# Patient Record
Sex: FEMALE | Race: WHITE | Employment: STUDENT | URBAN - METROPOLITAN AREA
[De-identification: names, ages, dates, MRNs, and addresses within clinical notes are randomized per-mention and may not be internally consistent; named-entity substitution may affect disease eponyms.]

---
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
## 2021-08-06 ENCOUNTER — TELEPHONE (OUTPATIENT)
Dept: SCHEDULING | Age: 21
End: 2021-08-06

## 2021-08-08 ENCOUNTER — APPOINTMENT (OUTPATIENT)
Dept: URGENT CARE | Age: 21
End: 2021-08-08

## 2021-08-08 ENCOUNTER — WALK IN (OUTPATIENT)
Dept: URGENT CARE | Age: 21
End: 2021-08-08

## 2021-08-08 VITALS — TEMPERATURE: 98.3 F

## 2021-08-08 DIAGNOSIS — Z11.1 SCREENING-PULMONARY TB: Primary | ICD-10-CM

## 2021-08-08 PROCEDURE — 86580 TB INTRADERMAL TEST: CPT | Performed by: NURSE PRACTITIONER

## 2021-08-10 ENCOUNTER — WALK IN (OUTPATIENT)
Dept: URGENT CARE | Age: 21
End: 2021-08-10

## 2021-08-10 DIAGNOSIS — Z11.1 ENCOUNTER FOR PPD SKIN TEST READING: Primary | ICD-10-CM

## 2021-08-10 LAB
INDURATION: 0 MM (ref 0–?)
SKIN TEST RESULT: NEGATIVE

## 2021-08-10 PROCEDURE — X1094 NO CHARGE VISIT: HCPCS | Performed by: NURSE PRACTITIONER

## 2022-04-21 PROBLEM — F33.1 MODERATE EPISODE OF RECURRENT MAJOR DEPRESSIVE DISORDER (HCC): Status: ACTIVE | Noted: 2022-04-21

## 2022-04-21 PROBLEM — F41.1 GAD (GENERALIZED ANXIETY DISORDER): Status: ACTIVE | Noted: 2022-04-21

## 2022-04-25 ENCOUNTER — TELEPHONE (OUTPATIENT)
Dept: FAMILY MEDICINE CLINIC | Facility: CLINIC | Age: 22
End: 2022-04-25

## 2022-04-25 NOTE — TELEPHONE ENCOUNTER
No current hipaa consent in record since pt turned 19. Call to mayda/mom's cell reaches identified voice mail. Per hipaa consent, left vmm advising returning her call, our ofc is now closed for the day, req call back to triage nurse tomorrow to discuss her message. Provided ofc phone hours/contact number. Advised if she has urgent questions that can not wait until tomorrow, call our ofc number now and follow the voice mail prompts to reach our office on call provider.

## 2022-04-27 NOTE — TELEPHONE ENCOUNTER
Call to mayda/mom-no hipaa consent since pt turned 19-sts \"i'm at the vet, can't talk, will call back\" and disconnects call

## 2022-04-29 NOTE — TELEPHONE ENCOUNTER
Call to mayda/mom-pt has no current hipaa consent since turning 19-sts her brother was staying with them recently prior to his having knee surgery, reports his preop testing was positive for MRSA, he was asx. Sts he works in a nursing home. Ortho proceeded w knee surgery as planned. sts she and daughters are asx but concerned about their poss exposure to him and want input from gala RAY or dr Brenna Jolly. Advised will discuss w gala RAY as dr Brenna Jolly out of ofc today. Will call back to pt w further info. Recommended pt update hipaa consent since she is now > 18  Mom voices understanding, agrees w plan. Discussed above info w gala RAY. She confirms as long as pt has no sx, no action needed. If pt develops any blisters, open wounds or crusted areas should be evaluated. Also recommend pt schedule annual visit, since last one was > 2 yrs ago.

## 2024-06-25 ENCOUNTER — HOSPITAL ENCOUNTER (EMERGENCY)
Age: 24
Discharge: HOME | End: 2024-06-25
Attending: EMERGENCY MEDICINE

## 2024-06-25 VITALS
DIASTOLIC BLOOD PRESSURE: 66 MMHG | OXYGEN SATURATION: 98 % | WEIGHT: 135 LBS | TEMPERATURE: 99 F | SYSTOLIC BLOOD PRESSURE: 103 MMHG | BODY MASS INDEX: 21.19 KG/M2 | HEART RATE: 79 BPM | HEIGHT: 67 IN | RESPIRATION RATE: 16 BRPM

## 2024-06-25 DIAGNOSIS — N30.90 CYSTITIS: Primary | ICD-10-CM

## 2024-06-25 LAB
B-HCG UR QL: NEGATIVE
BILIRUB UR QL STRIP.AUTO: NEGATIVE
GLUCOSE UR STRIP.AUTO-MCNC: NEGATIVE MG/DL
KETONES UR STRIP.AUTO-MCNC: NEGATIVE MG/DL
NITRITE UR QL STRIP.AUTO: NEGATIVE
PH UR STRIP.AUTO: 5.5 [PH] (ref 5–8)
RBC #/AREA URNS AUTO: >10 /HPF
SP GR UR STRIP.AUTO: >=1.03 (ref 1–1.03)
UROBILINOGEN UR STRIP.AUTO-MCNC: 0.2 MG/DL
WBC #/AREA URNS AUTO: >50 /HPF

## 2024-06-25 PROCEDURE — 87186 SC STD MICRODIL/AGAR DIL: CPT | Performed by: EMERGENCY MEDICINE

## 2024-06-25 PROCEDURE — 99283 EMERGENCY DEPT VISIT LOW MDM: CPT

## 2024-06-25 PROCEDURE — 87077 CULTURE AEROBIC IDENTIFY: CPT | Performed by: EMERGENCY MEDICINE

## 2024-06-25 PROCEDURE — 87086 URINE CULTURE/COLONY COUNT: CPT | Performed by: EMERGENCY MEDICINE

## 2024-06-25 PROCEDURE — 81025 URINE PREGNANCY TEST: CPT

## 2024-06-25 PROCEDURE — 99284 EMERGENCY DEPT VISIT MOD MDM: CPT

## 2024-06-25 PROCEDURE — 81001 URINALYSIS AUTO W/SCOPE: CPT | Performed by: EMERGENCY MEDICINE

## 2024-06-25 PROCEDURE — 81015 MICROSCOPIC EXAM OF URINE: CPT | Performed by: EMERGENCY MEDICINE

## 2024-06-25 RX ORDER — CEPHALEXIN 500 MG/1
500 CAPSULE ORAL ONCE
Status: COMPLETED | OUTPATIENT
Start: 2024-06-25 | End: 2024-06-25

## 2024-06-25 RX ORDER — CEPHALEXIN 500 MG/1
500 CAPSULE ORAL 3 TIMES DAILY
Qty: 21 CAPSULE | Refills: 0 | Status: SHIPPED | OUTPATIENT
Start: 2024-06-25 | End: 2024-07-02

## 2024-06-26 NOTE — ED PROVIDER NOTES
Patient Seen in: [facility] In [place]      History     Chief Complaint   Patient presents with    Urinary Symptoms     Stated Complaint: frequency and burning urination    Subjective:   HPI    Pleasant 23-year-old female.  No significant medical history.  Began to develop dysuria and frequency yesterday morning.  Persist today.  No systemic fever or chills.  No back pain or flank pain.  No history of recurrent or resistant UTIs.    Objective:   Past Medical History:    Acute pharyngitis    Acute upper respiratory infections of unspecified site    Early onset of delivery, unspecified as to episode of care    Hemangioma of unspecified site    Pediculus capitis (head louse)    Personal history of pneumonia (recurrent)    Streptococcal sore throat    Unspecified otitis media              Past Surgical History:   Procedure Laterality Date    Appendectomy      Other surgical history      hemangioma on head    Other surgical history N/A 4/29/2016    Procedure: LAPAROSCOPIC APPENDECTOMY;  Surgeon: Kezia Katz MD;  Location:  MAIN OR    Other surgical history  2020    wisdom teeth    Tonsillectomy  2009/2010                Social History     Socioeconomic History    Marital status: Single   Tobacco Use    Smoking status: Never    Smokeless tobacco: Never   Vaping Use    Vaping status: Never Used   Substance and Sexual Activity    Alcohol use: Yes     Comment: occ    Drug use: Never    Sexual activity: Never     Partners: Male   Other Topics Concern    Caffeine Concern No    Exercise Yes     Comment: soccer              Review of Systems    Positive for stated Chief Complaint: Urinary Symptoms    Other systems are as noted in HPI.  Constitutional and vital signs reviewed.      All other systems reviewed and negative except as noted above.    Physical Exam     ED Triage Vitals [06/25/24 2030]   /66   Pulse 79   Resp 16   Temp 98.5 °F (36.9 °C)   Temp src Oral   SpO2 98 %   O2 Device None  (Room air)       Current Vitals:   Vital Signs  BP: 103/66  Pulse: 79  Resp: 16  Temp: 98.5 °F (36.9 °C)  Temp src: Oral    Oxygen Therapy  SpO2: 98 %  O2 Device: None (Room air)            Physical Exam      Gen: Well appearing, well groomed, alert and aware x 3  ENT: Atraumatic  Lung: No distress, RR, no retraction,  Back: Full range of motion, no CVA tenderness  Abdominal: Soft exam, no distention.  No pain to palpation all 4 quadrants    ED Course     Labs Reviewed   URINALYSIS WITH CULTURE REFLEX - Abnormal; Notable for the following components:       Result Value    Clarity Urine Cloudy (*)     Blood Urine Large (*)     Protein Urine 100 mg/dL (*)     Leukocyte Esterase Urine Moderate (*)     All other components within normal limits   POCT PREGNANCY URINE - Normal   UA MICROSCOPIC ONLY, URINE   URINE CULTURE, ROUTINE                      MDM      My supervising physician was involved in the management of this patient.    Well-appearing female with normal vital signs.  Urine demonstrates moderate leukocytes and large blood.  Will be sent for culture.  Urine pregnancy negative.  Patient has never had a UTI before.  She denies any systemic malaise, back pain or flank pain.  She received first dosage of Keflex in the ER.  Will continue 3 times daily for the next 7 days.  Please seek reevaluation for any worsening                               Medical Decision Making      Disposition and Plan     Clinical Impression:  1. Cystitis         Disposition:  There is no disposition on file for this visit.  There is no disposition time on file for this visit.    Follow-up:  Denisha West MD  [facility] [place] [address] [id number] [phone]    Follow up            Medications Prescribed:  Current Discharge Medication List        START taking these medications    Details   cephalexin 500 MG Oral Cap Take 1 capsule (500 mg total) by mouth 3 (three) times daily for 7 days.  Qty: 21 capsule, Refills: 0

## 2024-06-26 NOTE — DISCHARGE INSTRUCTIONS
Push clear fluids.  Take antibiotic as written.  If urine culture dictates a change in therapy you will be contacted.  For any worsening please seek reevaluation

## 2024-07-05 ENCOUNTER — OFFICE VISIT (OUTPATIENT)
Dept: FAMILY MEDICINE CLINIC | Facility: CLINIC | Age: 24
End: 2024-07-05
Payer: COMMERCIAL

## 2024-07-05 ENCOUNTER — LAB ENCOUNTER (OUTPATIENT)
Dept: LAB | Age: 24
End: 2024-07-05
Attending: STUDENT IN AN ORGANIZED HEALTH CARE EDUCATION/TRAINING PROGRAM
Payer: COMMERCIAL

## 2024-07-05 VITALS
HEART RATE: 72 BPM | BODY MASS INDEX: 20.25 KG/M2 | RESPIRATION RATE: 16 BRPM | SYSTOLIC BLOOD PRESSURE: 92 MMHG | DIASTOLIC BLOOD PRESSURE: 58 MMHG | HEIGHT: 67 IN | TEMPERATURE: 97 F | WEIGHT: 129 LBS

## 2024-07-05 DIAGNOSIS — Z13.228 SCREENING FOR METABOLIC DISORDER: ICD-10-CM

## 2024-07-05 DIAGNOSIS — Z00.00 WELLNESS EXAMINATION: ICD-10-CM

## 2024-07-05 DIAGNOSIS — Z11.1 SCREENING FOR TUBERCULOSIS: ICD-10-CM

## 2024-07-05 DIAGNOSIS — Z11.3 SCREENING FOR STD (SEXUALLY TRANSMITTED DISEASE): ICD-10-CM

## 2024-07-05 DIAGNOSIS — Z00.00 WELLNESS EXAMINATION: Primary | ICD-10-CM

## 2024-07-05 DIAGNOSIS — Z02.1 PRE-EMPLOYMENT EXAMINATION: ICD-10-CM

## 2024-07-05 DIAGNOSIS — Z01.84 IMMUNITY STATUS TESTING: ICD-10-CM

## 2024-07-05 DIAGNOSIS — Z02.89 ENCOUNTER FOR COMPLETION OF FORM WITH PATIENT: ICD-10-CM

## 2024-07-05 LAB
ALBUMIN SERPL-MCNC: 4.2 G/DL (ref 3.4–5)
ALBUMIN/GLOB SERPL: 1.2 {RATIO} (ref 1–2)
ALP LIVER SERPL-CCNC: 47 U/L
ALT SERPL-CCNC: 27 U/L
ANION GAP SERPL CALC-SCNC: 5 MMOL/L (ref 0–18)
AST SERPL-CCNC: 27 U/L (ref 15–37)
BASOPHILS # BLD AUTO: 0.01 X10(3) UL (ref 0–0.2)
BASOPHILS NFR BLD AUTO: 0.2 %
BILIRUB SERPL-MCNC: 0.6 MG/DL (ref 0.1–2)
BILIRUB UR QL STRIP.AUTO: NEGATIVE
BUN BLD-MCNC: 17 MG/DL (ref 9–23)
CALCIUM BLD-MCNC: 9.6 MG/DL (ref 8.5–10.1)
CHLORIDE SERPL-SCNC: 104 MMOL/L (ref 98–112)
CHOLEST SERPL-MCNC: 159 MG/DL (ref ?–200)
CLARITY UR REFRACT.AUTO: CLEAR
CO2 SERPL-SCNC: 29 MMOL/L (ref 21–32)
CREAT BLD-MCNC: 1.04 MG/DL
EGFRCR SERPLBLD CKD-EPI 2021: 78 ML/MIN/1.73M2 (ref 60–?)
EOSINOPHIL # BLD AUTO: 0.05 X10(3) UL (ref 0–0.7)
EOSINOPHIL NFR BLD AUTO: 1 %
ERYTHROCYTE [DISTWIDTH] IN BLOOD BY AUTOMATED COUNT: 14.3 %
FASTING PATIENT LIPID ANSWER: NO
FASTING STATUS PATIENT QL REPORTED: NO
GLOBULIN PLAS-MCNC: 3.5 G/DL (ref 2.8–4.4)
GLUCOSE BLD-MCNC: 96 MG/DL (ref 70–99)
GLUCOSE UR STRIP.AUTO-MCNC: NORMAL MG/DL
HBV CORE AB SERPL QL IA: NONREACTIVE
HBV SURFACE AB SER QL: REACTIVE
HBV SURFACE AB SERPL IA-ACNC: >1000 MIU/ML
HBV SURFACE AG SER-ACNC: <0.1 [IU]/L
HBV SURFACE AG SER-ACNC: <0.1 [IU]/L
HBV SURFACE AG SERPL QL IA: NONREACTIVE
HBV SURFACE AG SERPL QL IA: NONREACTIVE
HCT VFR BLD AUTO: 39.8 %
HCV AB SERPL QL IA: NONREACTIVE
HDLC SERPL-MCNC: 55 MG/DL (ref 40–59)
HGB BLD-MCNC: 13.8 G/DL
IMM GRANULOCYTES # BLD AUTO: 0.01 X10(3) UL (ref 0–1)
IMM GRANULOCYTES NFR BLD: 0.2 %
KETONES UR STRIP.AUTO-MCNC: NEGATIVE MG/DL
LDLC SERPL CALC-MCNC: 82 MG/DL (ref ?–100)
LEUKOCYTE ESTERASE UR QL STRIP.AUTO: NEGATIVE
LYMPHOCYTES # BLD AUTO: 1.65 X10(3) UL (ref 1–4)
LYMPHOCYTES NFR BLD AUTO: 34.1 %
MCH RBC QN AUTO: 31.6 PG (ref 26–34)
MCHC RBC AUTO-ENTMCNC: 34.7 G/DL (ref 31–37)
MCV RBC AUTO: 91.1 FL
MONOCYTES # BLD AUTO: 0.33 X10(3) UL (ref 0.1–1)
MONOCYTES NFR BLD AUTO: 6.8 %
NEUTROPHILS # BLD AUTO: 2.79 X10 (3) UL (ref 1.5–7.7)
NEUTROPHILS # BLD AUTO: 2.79 X10(3) UL (ref 1.5–7.7)
NEUTROPHILS NFR BLD AUTO: 57.7 %
NITRITE UR QL STRIP.AUTO: NEGATIVE
NONHDLC SERPL-MCNC: 104 MG/DL (ref ?–130)
OSMOLALITY SERPL CALC.SUM OF ELEC: 287 MOSM/KG (ref 275–295)
PH UR STRIP.AUTO: 7.5 [PH] (ref 5–8)
PLATELET # BLD AUTO: 236 10(3)UL (ref 150–450)
POTASSIUM SERPL-SCNC: 4.4 MMOL/L (ref 3.5–5.1)
PROT SERPL-MCNC: 7.7 G/DL (ref 6.4–8.2)
PROT UR STRIP.AUTO-MCNC: NEGATIVE MG/DL
RBC # BLD AUTO: 4.37 X10(6)UL
RBC UR QL AUTO: NEGATIVE
SODIUM SERPL-SCNC: 138 MMOL/L (ref 136–145)
SP GR UR STRIP.AUTO: 1.02 (ref 1–1.03)
TRIGL SERPL-MCNC: 124 MG/DL (ref 30–149)
TSI SER-ACNC: 1.62 MIU/ML (ref 0.36–3.74)
UROBILINOGEN UR STRIP.AUTO-MCNC: NORMAL MG/DL
VLDLC SERPL CALC-MCNC: 20 MG/DL (ref 0–30)
WBC # BLD AUTO: 4.8 X10(3) UL (ref 4–11)

## 2024-07-05 PROCEDURE — 82955 ASSAY OF G6PD ENZYME: CPT

## 2024-07-05 PROCEDURE — 81003 URINALYSIS AUTO W/O SCOPE: CPT

## 2024-07-05 PROCEDURE — 86480 TB TEST CELL IMMUN MEASURE: CPT

## 2024-07-05 PROCEDURE — 85025 COMPLETE CBC W/AUTO DIFF WBC: CPT

## 2024-07-05 PROCEDURE — 87340 HEPATITIS B SURFACE AG IA: CPT

## 2024-07-05 PROCEDURE — 87389 HIV-1 AG W/HIV-1&-2 AB AG IA: CPT

## 2024-07-05 PROCEDURE — 86704 HEP B CORE ANTIBODY TOTAL: CPT

## 2024-07-05 PROCEDURE — 80061 LIPID PANEL: CPT

## 2024-07-05 PROCEDURE — 86803 HEPATITIS C AB TEST: CPT

## 2024-07-05 PROCEDURE — 84443 ASSAY THYROID STIM HORMONE: CPT

## 2024-07-05 PROCEDURE — 86706 HEP B SURFACE ANTIBODY: CPT

## 2024-07-05 PROCEDURE — 86592 SYPHILIS TEST NON-TREP QUAL: CPT

## 2024-07-05 PROCEDURE — 80053 COMPREHEN METABOLIC PANEL: CPT

## 2024-07-05 NOTE — PROGRESS NOTES
[facility]  07/05/24    Chief Complaint   Patient presents with    Physical       HPI:   Alina Cantor is a 23 year old female who presents for a complete physical exam.     She always wanted to apply for [employer]. 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 Has some paperwork and labs due for the [employer].     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 (Menstrual status: Continuous Pill). No breakthrough bleeding or spotting  Immunizations: got original covid series and booster  Screenings: follows with gynecology      Wt Readings from Last 6 Encounters:   07/05/24 129 lb (58.5 kg)   06/25/24 135 lb (61.2 kg)   06/14/21 140 lb 9.6 oz (63.8 kg) (70%, Z= 0.53)*   12/11/20 145 lb 9.6 oz (66 kg) (77%, Z= 0.75)*   07/30/20 143 lb (64.9 kg) (76%, Z= 0.70)*   10/16/19 143 lb (64.9 kg) (78%, Z= 0.78)*     * Growth percentiles are based on CDC (Girls, 2-20 Years) data.     Body mass index is 20.2 kg/m².     Results for orders placed or performed during the hospital encounter of 06/25/24   Urinalysis with Culture Reflex    Specimen: Urine, clean catch   Result Value Ref Range    Urine Color Straw Yellow    Clarity Urine Cloudy (A) Clear    Spec Gravity >=1.030 1.005 - 1.030    Glucose Urine Negative Negative mg/dL    Bilirubin Urine Negative Negative    Ketones Urine Negative Negative mg/dL    Blood Urine Large (A) Negative    pH Urine 5.5 5.0 - 8.0    Protein Urine 100 mg/dL (A) Negative mg/dL    Urobilinogen Urine 0.2 <2.0 mg/dL    Nitrite Urine Negative Negative     Leukocyte Esterase Urine Moderate (A) Negative   UA Microscopic only, urine   Result Value Ref Range    WBC Urine >50 (A) 0 - 5 /HPF    RBC Urine >10 (A) 0 - 2 /HPF    Bacteria Urine Rare (A) None Seen /HPF    Squamous Epi. Cells Few (A) None Seen /HPF    Renal Tubular Epithelial Cells None Seen None Seen /HPF    Transitional Cells None Seen None Seen /HPF    Yeast Urine None Seen None Seen /HPF   POCT Pregnancy, Urine   Result Value Ref Range    POCT Urine Pregnancy Negative Negative   Urine Culture, Routine    Specimen: Urine, clean catch   Result Value Ref Range    Urine Culture >100,000 CFU/ML Klebsiella pneumoniae (A)        Susceptibility    Klebsiella pneumoniae -  (no method available)     Ampicillin  Resistant      Ampicillin + Sulbactam 4 Sensitive      Cefazolin <=4 Sensitive      Ciprofloxacin <=0.25 Sensitive      Gentamicin <=1 Sensitive      Meropenem <=0.25 Sensitive      Levofloxacin <=0.12 Sensitive      Nitrofurantoin 32 Sensitive      Piperacillin + Tazobactam <=4 Sensitive      Trimethoprim/Sulfa <=20 Sensitive        Current Outpatient Medications   Medication Sig Dispense Refill    FLUoxetine (PROZAC) 20 MG Oral Cap Take 1 capsule (20 mg total) by mouth every morning. 30 capsule 0    Norgestimate-Eth Estradiol 0.25-35 MG-MCG Oral Tab Take 1 tablet by mouth daily. 3 each 3      No Known Allergies   Past Medical History:    Acute pharyngitis    Acute upper respiratory infections of unspecified site    Early onset of delivery, unspecified as to episode of care    Hemangioma of unspecified site    Pediculus capitis (head louse)    Personal history of pneumonia (recurrent)    Streptococcal sore throat    Unspecified otitis media      Past Surgical History:   Procedure Laterality Date    Appendectomy      Other surgical history      hemangioma on head    Other surgical history N/A 4/29/2016    Procedure: LAPAROSCOPIC APPENDECTOMY;  Surgeon: Kezia Katz MD;  Location:  MAIN OR    Other surgical  history  2020    wisdom teeth    Tonsillectomy  2009/2010      Family History   Problem Relation Age of Onset    Heart Disease Maternal Grandmother     Other (Other) Maternal Grandmother     Heart Disease Paternal Grandfather     Stroke Paternal Grandfather     Other (Alzheimer's) Paternal Grandfather     Cancer Paternal Grandmother     Heart Disorder Maternal Grandfather     Heart Disorder Sister     Alcohol and Other Disorders Associated Father     No Known Problems Mother       Social History:   Social History     Socioeconomic History    Marital status: Single   Tobacco Use    Smoking status: Never    Smokeless tobacco: Never   Vaping Use    Vaping status: Never Used   Substance and Sexual Activity    Alcohol use: Yes     Comment: 1 every 2 weeks    Drug use: Never    Sexual activity: Never     Partners: Male   Other Topics Concern    Caffeine Concern Yes     Comment: 1 c. coffee some days    Exercise Yes     Comment: walk, pilates        REVIEW OF SYSTEMS:   GENERAL: feels well otherwise  SKIN: denies any unusual skin lesions  EYES:denies blurred vision or double vision  HEENT: denies nasal congestion, sinus pain or ST  LUNGS: denies shortness of breath with exertion, denies cough  CARDIOVASCULAR: denies chest pain on exertion or at rest, denies palpitations  GI: denies abdominal pain,denies heartburn, denies n/v/d/c/blood in stool  : denies dysuria, vaginal discharge or itching,periods regular   MUSCULOSKELETAL: denies back pain  NEURO: denies headaches, denies LH/dizziness/syncope  PSYCHE: see HPI  HEMATOLOGIC: denies hx of anemia  ENDOCRINE: denies thyroid history  ALL/ASTHMA: denies hx of allergy or asthma    EXAM:   BP 92/58   Pulse 72   Temp 97.2 °F (36.2 °C) (Temporal)   Resp 16   Ht 5' 7\" (1.702 m)   Wt 129 lb (58.5 kg)   BMI 20.20 kg/m²   Body mass index is 20.2 kg/m².   GENERAL: well developed, well nourished,in no apparent distress  SKIN: no rash  HEENT: atraumatic, normocephalic,ears and  throat are clear  EYES:PERRLA, EOMI,conjunctiva are clear  NECK: supple,no adenopathy,no thyromegaly  BREAST: deferred to gynecology  LUNGS: clear to auscultation; no rhonchi, rales, or wheezing  CARDIO: RRR without murmur  GI: good BS's, no masses, HSM or tenderness  : deferred to gynecology  MUSCULOSKELETAL: FROM of the back, negative SLR b/l  EXTREMITIES: no cyanosis, clubbing or edema  NEURO: Oriented times three,cranial nerves are intact,motor and sensory are grossly intact; 2+ knee reflexes bilaterally, gait normal  VASCULAR: 2+ posterior tibial pulses bilaterally and 2+ radial pulses b/l    ASSESSMENT AND PLAN:   Alina Cantor is a 23 year old female who presents for a complete physical exam.     - Pap and pelvic deferred to gynecology. Last pap: 10/06/2023 and was normal.  - BP: at goal  - Pt' s weight is Body mass index is 20.2 kg/m²., normal, recommended low fat diet and aerobic exercise 30 minutes three times weekly.      Labs ordered for [employer] paperwork. Signed physical form.       The patient indicates understanding of these issues and agrees to the plan.      Health maintenance, will check:   Orders Placed This Encounter   Procedures    CBC With Differential With Platelet    Comp Metabolic Panel (14)    TSH W Reflex To Free T4    Lipid Panel    Urinalysis with Culture Reflex    HIV AG AB Combo [E]    Hepatitis B Profile [E]    Hepatitis B Surface Antigen    HCV ANTIBODY [8472] [Q]    RPR W/REF TO TITER & CONFIRM [36148][Q]    G-6-Pd, Quant, Blood And Rbc    Quantiferon TB Plus           Encounter Diagnoses   Name Primary?    Wellness examination Yes    Pre-employment examination     Screening for STD (sexually transmitted disease)     Screening for tuberculosis     Screening for metabolic disorder     Immunity status testing     Encounter for completion of form with patient        Meds & Refills for this Visit:  Requested Prescriptions      No prescriptions requested or ordered in this  encounter       Imaging & Consults:  None      Return in about 1 year (around 7/5/2025) for annual physical, or sooner if needed.      Denisha West MD  [facility]  07/05/24

## 2024-07-07 LAB — RPR SER QL: NONREACTIVE

## 2024-07-08 LAB
M TB IFN-G CD4+ T-CELLS BLD-ACNC: 0.02 IU/ML
M TB TUBERC IFN-G BLD QL: NEGATIVE
M TB TUBERC IGNF/MITOGEN IGNF CONTROL: >10 IU/ML
QFT TB1 AG MINUS NIL: 0 IU/ML
QFT TB2 AG MINUS NIL: 0.01 IU/ML

## 2024-07-09 LAB
G6PD QN: 267 U/10E12 RBC
RBC: 4.34 X10E6/UL

## 2024-07-16 ENCOUNTER — HOSPITAL ENCOUNTER (EMERGENCY)
Age: 24
Discharge: HOME | End: 2024-07-16
Payer: COMMERCIAL

## 2024-07-16 VITALS
HEART RATE: 58 BPM | SYSTOLIC BLOOD PRESSURE: 96 MMHG | HEIGHT: 67 IN | DIASTOLIC BLOOD PRESSURE: 66 MMHG | TEMPERATURE: 99 F | OXYGEN SATURATION: 96 % | RESPIRATION RATE: 18 BRPM | WEIGHT: 130 LBS | BODY MASS INDEX: 20.4 KG/M2

## 2024-07-16 DIAGNOSIS — R30.0 DYSURIA: Primary | ICD-10-CM

## 2024-07-16 LAB
B-HCG UR QL: NEGATIVE
BILIRUB UR QL STRIP.AUTO: NEGATIVE
CLARITY UR REFRACT.AUTO: CLEAR
COLOR UR AUTO: YELLOW
GLUCOSE UR STRIP.AUTO-MCNC: NEGATIVE MG/DL
KETONES UR STRIP.AUTO-MCNC: NEGATIVE MG/DL
LEUKOCYTE ESTERASE UR QL STRIP.AUTO: NEGATIVE
NITRITE UR QL STRIP.AUTO: NEGATIVE
PH UR STRIP.AUTO: 6 [PH] (ref 5–8)
PROT UR STRIP.AUTO-MCNC: NEGATIVE MG/DL
RBC UR QL AUTO: NEGATIVE
SP GR UR STRIP.AUTO: 1.02 (ref 1–1.03)
UROBILINOGEN UR STRIP.AUTO-MCNC: 0.2 MG/DL

## 2024-07-16 PROCEDURE — 99283 EMERGENCY DEPT VISIT LOW MDM: CPT

## 2024-07-16 PROCEDURE — 81025 URINE PREGNANCY TEST: CPT

## 2024-07-16 PROCEDURE — 87086 URINE CULTURE/COLONY COUNT: CPT | Performed by: PHYSICIAN ASSISTANT

## 2024-07-16 PROCEDURE — 81003 URINALYSIS AUTO W/O SCOPE: CPT

## 2024-07-16 NOTE — DISCHARGE INSTRUCTIONS
Push clear fluids.  Monitor for any worsening.  If urine culture dictates further treatment you will be contacted

## 2024-07-16 NOTE — ED INITIAL ASSESSMENT (HPI)
Patient here with urinary frequency and lower abdominal pain that started today. States she was diagnosed with UTI a month ago and started with similar symptoms.

## 2024-07-16 NOTE — ED PROVIDER NOTES
Patient Seen in: [facility] In [place]      History     Chief Complaint   Patient presents with    Urinary Symptoms     Stated Complaint: urinary symptoms    Subjective:   HPI    23-year-old female.  Just this morning, patient began to develop subtle dysuria and frequency  Patient was treated for a UTI 1 month prior to arrival.  Subsequent culture demonstrated Klebsiella.  Resistant ampicillin.  She was treated successfully with Keflex.  Her symptoms resolved within a few days.  That episode, 1 month prior to arrival, was her first episode of cystitis in her lifetime.  She denies any back pain or flank pain.  No vaginal bleeding or discharge    Objective:   Past Medical History:    Acute pharyngitis    Acute upper respiratory infections of unspecified site    Early onset of delivery, unspecified as to episode of care    Hemangioma of unspecified site    Pediculus capitis (head louse)    Personal history of pneumonia (recurrent)    Streptococcal sore throat    Unspecified otitis media              Past Surgical History:   Procedure Laterality Date    Appendectomy      Other surgical history      hemangioma on head    Other surgical history N/A 4/29/2016    Procedure: LAPAROSCOPIC APPENDECTOMY;  Surgeon: Kezia Katz MD;  Location:  MAIN OR    Other surgical history  2020    wisdom teeth    Tonsillectomy  2009/2010                Social History     Socioeconomic History    Marital status: Single   Tobacco Use    Smoking status: Never    Smokeless tobacco: Never   Vaping Use    Vaping status: Never Used   Substance and Sexual Activity    Alcohol use: Yes     Comment: 1 every 2 weeks    Drug use: Never    Sexual activity: Never     Partners: Male   Other Topics Concern    Caffeine Concern Yes     Comment: 1 c. coffee some days    Exercise Yes     Comment: thelma ramos              Review of Systems    Positive for stated Chief Complaint: Urinary Symptoms    Other systems are as noted in  HPI.  Constitutional and vital signs reviewed.      All other systems reviewed and negative except as noted above.    Physical Exam     ED Triage Vitals [07/16/24 1636]   BP 96/66   Pulse 58   Resp 18   Temp 99 °F (37.2 °C)   Temp src Oral   SpO2 96 %   O2 Device None (Room air)       Current Vitals:   Vital Signs  BP: 96/66  Pulse: 58  Resp: 18  Temp: 99 °F (37.2 °C)  Temp src: Oral    Oxygen Therapy  SpO2: 96 %  O2 Device: None (Room air)            Physical Exam      Gen: Well appearing, well groomed, alert and aware x 3  ENT: Atraumatic  Lung: No distress, RR, no retraction,  Back: Full range of motion, no CVA tenderness  Abdominal: Soft exam, no distention.  No pain to palpation all 4 quadrants      ED Course     Labs Reviewed   POCT PREGNANCY URINE - Normal   URINALYSIS WITH CULTURE REFLEX   URINE CULTURE, ROUTINE                      MDM          This is a well-appearing female with normal vital signs.  No complaint of back pain or flank pain.  Denies any vaginal discharge or bleeding    Onset of subtle symptoms this morning.    Urinalysis performed and benign.  Will send for culture.  Push clear fluids.      Pelvic exam offered but declined by patient.  Urine sent for culture                         Medical Decision Making      Disposition and Plan     Clinical Impression:  1. Dysuria         Disposition:  Discharge  7/16/2024  5:02 pm    Follow-up:  No follow-up provider specified.        Medications Prescribed:  Current Discharge Medication List

## 2024-07-18 ENCOUNTER — TELEPHONE (OUTPATIENT)
Dept: FAMILY MEDICINE CLINIC | Facility: CLINIC | Age: 24
End: 2024-07-18

## 2024-07-18 DIAGNOSIS — Z23 NEED FOR VACCINATION: Primary | ICD-10-CM

## 2024-07-18 NOTE — TELEPHONE ENCOUNTER
I called the patient. 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 She would prefer to keep the appointment for the nurse visit on 08/01. Please place order for polo injection. Thank you

## 2024-07-18 NOTE — TELEPHONE ENCOUNTER
Is she able to have titers checked first? Typically I check the titers prior to giving a booster. Thank you.

## 2024-08-01 ENCOUNTER — NURSE ONLY (OUTPATIENT)
Dept: FAMILY MEDICINE CLINIC | Facility: CLINIC | Age: 24
End: 2024-08-01
Payer: COMMERCIAL

## 2024-08-01 DIAGNOSIS — Z23 NEED FOR VACCINATION: ICD-10-CM

## 2024-08-01 PROCEDURE — 90713 POLIOVIRUS IPV SC/IM: CPT | Performed by: STUDENT IN AN ORGANIZED HEALTH CARE EDUCATION/TRAINING PROGRAM

## 2024-08-01 PROCEDURE — 90471 IMMUNIZATION ADMIN: CPT | Performed by: STUDENT IN AN ORGANIZED HEALTH CARE EDUCATION/TRAINING PROGRAM

## 2024-08-01 NOTE — PROGRESS NOTES
Pt presents for polio injection.  Reinforced possible side effects and conservative management measures, as needed.   Advised to contact ofc and speak with triage nurse or provider on call if any severe reactions.   Patient voices understanding, no additional questions.    Injection given without difficulty and tolerated well by pt.

## 2025-05-12 ENCOUNTER — HOSPITAL ENCOUNTER (OUTPATIENT)
Age: 25
Discharge: HOME | End: 2025-05-12
Payer: COMMERCIAL

## 2025-05-12 VITALS
HEIGHT: 67 IN | WEIGHT: 135 LBS | SYSTOLIC BLOOD PRESSURE: 105 MMHG | BODY MASS INDEX: 21.19 KG/M2 | HEART RATE: 64 BPM | TEMPERATURE: 98 F | OXYGEN SATURATION: 98 % | DIASTOLIC BLOOD PRESSURE: 72 MMHG | RESPIRATION RATE: 18 BRPM

## 2025-05-12 DIAGNOSIS — N30.00 ACUTE CYSTITIS WITHOUT HEMATURIA: Primary | ICD-10-CM

## 2025-05-12 LAB
B-HCG UR QL: NEGATIVE
BILIRUB UR QL STRIP: NEGATIVE
CLARITY UR: CLEAR
GLUCOSE UR STRIP-MCNC: 100 MG/DL
HGB UR QL STRIP: NEGATIVE
KETONES UR STRIP-MCNC: NEGATIVE MG/DL
LEUKOCYTE ESTERASE UR QL STRIP: NEGATIVE
NITRITE UR QL STRIP: POSITIVE
PH UR STRIP: 6 [PH]
PROT UR STRIP-MCNC: NEGATIVE MG/DL
SP GR UR STRIP: 1.01
UROBILINOGEN UR STRIP-ACNC: <2 MG/DL

## 2025-05-12 PROCEDURE — 81025 URINE PREGNANCY TEST: CPT

## 2025-05-12 PROCEDURE — 87086 URINE CULTURE/COLONY COUNT: CPT | Performed by: NURSE PRACTITIONER

## 2025-05-12 PROCEDURE — 99214 OFFICE O/P EST MOD 30 MIN: CPT

## 2025-05-12 PROCEDURE — 81002 URINALYSIS NONAUTO W/O SCOPE: CPT

## 2025-05-12 RX ORDER — CEPHALEXIN 500 MG/1
500 CAPSULE ORAL 2 TIMES DAILY
Qty: 14 CAPSULE | Refills: 0 | Status: SHIPPED | OUTPATIENT
Start: 2025-05-12 | End: 2025-05-19

## 2025-05-12 NOTE — ED PROVIDER NOTES
Patient Seen in: [facility]      History     Chief Complaint   Patient presents with    Urinary Symptoms     Stated Complaint: Urinary Symptoms    Subjective:   HPI  24-year-old presents complaining of dysuria with urinary urgency and frequency for the past 2 to 3 days.  She states it feels the same as her previous UTIs.  She denies any vaginal complaints or STD concerns.    Objective:     Past Medical History:    Acute pharyngitis    Acute upper respiratory infections of unspecified site    Early onset of delivery, unspecified as to episode of care    Hemangioma of unspecified site    Pediculus capitis (head louse)    Personal history of pneumonia (recurrent)    Streptococcal sore throat    Unspecified otitis media              Past Surgical History:   Procedure Laterality Date    Appendectomy      Other surgical history      hemangioma on head    Other surgical history N/A 4/29/2016    Procedure: LAPAROSCOPIC APPENDECTOMY;  Surgeon: Kezia Katz MD;  Location:  MAIN OR    Other surgical history  2020    wisdom teeth    Tonsillectomy  2009/2010                Social History     Socioeconomic History    Marital status: Single   Tobacco Use    Smoking status: Never    Smokeless tobacco: Never   Vaping Use    Vaping status: Never Used   Substance and Sexual Activity    Alcohol use: Yes     Comment: 1 every 2 weeks    Drug use: Never    Sexual activity: Never     Partners: Male   Other Topics Concern    Caffeine Concern Yes     Comment: 1 c. coffee some days    Exercise Yes     Comment: walk, pilates              Review of Systems   All other systems reviewed and are negative.      Positive for stated complaint: Urinary Symptoms  Other systems are as noted in HPI.  Constitutional and vital signs reviewed.      All other systems reviewed and negative except as noted above.                  Physical Exam     ED Triage Vitals [05/12/25 1051]   /72   Pulse 64   Resp 18   Temp 97.8 °F (36.6 °C)    Temp src Oral   SpO2 98 %   O2 Device None (Room air)       Current Vitals:   Vital Signs  BP: 105/72  Pulse: 64  Resp: 18  Temp: 97.8 °F (36.6 °C)  Temp src: Oral    Oxygen Therapy  SpO2: 98 %  O2 Device: None (Room air)        Physical Exam  Vitals and nursing note reviewed.   Constitutional:       General: She is not in acute distress.     Appearance: She is well-developed. She is not ill-appearing or toxic-appearing.   Cardiovascular:      Rate and Rhythm: Normal rate and regular rhythm.      Heart sounds: Normal heart sounds.   Pulmonary:      Effort: Pulmonary effort is normal.      Breath sounds: Normal breath sounds.   Skin:     General: Skin is warm and dry.   Neurological:      Mental Status: She is alert and oriented to person, place, and time.             ED Course     Labs Reviewed   [facility] POCT URINALYSIS DIPSTICK - Abnormal; Notable for the following components:       Result Value    Urine Color Orange (*)     Glucose, Urine 100 (*)     Nitrite Urine Positive (*)     All other components within normal limits   POCT PREGNANCY URINE - Normal   URINE CULTURE, ROUTINE          Results                         MDM     Medical Decision Making  24-year-old presents complaining of dysuria with urinary urgency and frequency for the past 2 to 3 days.  She states it feels the same as her previous UTIs.  She denies any vaginal complaints or STD concerns.    Pertinent Labs & Imaging studies reviewed. (See chart for details).  Patient coming in with UTI symptoms.   Differential diagnosis includes but not limited to UTI, pyelonephritis, kidney stone, vaginitis, GC  Labs reviewed pregnancy negative.  STD concerns of vaginal swab declined.  Urine dip skewed due to patient taking Azo.  Urine culture sent.  Will treat for cystitis.  Will discharge on Keflex pending urine culture. Patient/Parent is comfortable with this plan.    Overall Pt looks good. Non-toxic, well-hydrated and in no respiratory distress. Vital signs are  reassuring. Exam is reassuring. I do not believe pt requires and additional diagnostic studies or intervention. I believe pt can be discharged home to continue evaluation as an outpatient. Follow-up provider given. Discharge instructions given and reviewed. Return for any problems. All understand and agree with the plan.        Problems Addressed:  Acute cystitis without hematuria: acute illness or injury        Disposition and Plan     Clinical Impression:  1. Acute cystitis without hematuria         Disposition:  Discharge  5/12/2025 11:08 am    Follow-up:  No follow-up provider specified.        Medications Prescribed:  Discharge Medication List as of 5/12/2025 11:08 AM        START taking these medications    Details   cephALEXin 500 MG Oral Cap Take 1 capsule (500 mg total) by mouth 2 (two) times daily for 7 days., Normal, Disp-14 capsule, R-0             Supplementary Documentation:

## 2025-05-12 NOTE — DISCHARGE INSTRUCTIONS
Take antibiotic as directed.  We will call if urine culture is positive with change in treatment.  Go to ER with any fever, vomiting or flank pain.

## 2025-06-22 ENCOUNTER — APPOINTMENT (OUTPATIENT)
Dept: GENERAL RADIOLOGY | Age: 25
End: 2025-06-22
Attending: PHYSICIAN ASSISTANT
Payer: COMMERCIAL

## 2025-06-22 ENCOUNTER — HOSPITAL ENCOUNTER (OUTPATIENT)
Age: 25
Discharge: HOME | End: 2025-06-22
Payer: COMMERCIAL

## 2025-06-22 VITALS
HEIGHT: 67 IN | OXYGEN SATURATION: 97 % | DIASTOLIC BLOOD PRESSURE: 69 MMHG | WEIGHT: 135 LBS | HEART RATE: 55 BPM | TEMPERATURE: 98 F | BODY MASS INDEX: 21.19 KG/M2 | SYSTOLIC BLOOD PRESSURE: 100 MMHG | RESPIRATION RATE: 18 BRPM

## 2025-06-22 VITALS
WEIGHT: 134.94 LBS | OXYGEN SATURATION: 97 % | HEIGHT: 67 IN | SYSTOLIC BLOOD PRESSURE: 100 MMHG | TEMPERATURE: 98 F | HEART RATE: 55 BPM | DIASTOLIC BLOOD PRESSURE: 69 MMHG | BODY MASS INDEX: 21.18 KG/M2 | RESPIRATION RATE: 18 BRPM

## 2025-06-22 DIAGNOSIS — R07.89 CHEST DISCOMFORT: Primary | ICD-10-CM

## 2025-06-22 PROCEDURE — 99214 OFFICE O/P EST MOD 30 MIN: CPT

## 2025-06-22 PROCEDURE — 99213 OFFICE O/P EST LOW 20 MIN: CPT

## 2025-06-22 PROCEDURE — 71046 X-RAY EXAM CHEST 2 VIEWS: CPT | Performed by: PHYSICIAN ASSISTANT

## 2025-06-22 PROCEDURE — 93010 ELECTROCARDIOGRAM REPORT: CPT

## 2025-06-22 PROCEDURE — 93005 ELECTROCARDIOGRAM TRACING: CPT

## 2025-06-22 NOTE — ED INITIAL ASSESSMENT (HPI)
Chest pain x 2 days  intermittent  x 30 secs  4x per day.  Last chest pain at 7 am.   C/o chest discomfort 2/10

## 2025-06-22 NOTE — ED PROVIDER NOTES
Patient Seen in: [facility]      History  Chief Complaint   Patient presents with    Chest Pain     Stated Complaint: Chest Discomfort-was here earlier    Subjective:   HPI            24-year-old female with past medical history significant for anorexia nervosa, generalized anxiety disorder, major depressive disorder returns to immediate care after just being discharged. She initially declined chest x-ray and labs for further evaluation of intermittent left-side chest discomfort for the past 2 days but now wants chest x-ray.   She denies any chance of pregnancy stating she is not sexually active.      Objective:     No pertinent past medical history.            No pertinent past surgical history.              No pertinent social history.            Review of Systems    Positive for stated complaint: Chest Discomfort-was here earlier  Other systems are as noted in HPI.  Constitutional and vital signs reviewed.      All other systems reviewed and negative except as noted above.        Physical Exam    ED Triage Vitals [06/22/25 0904]   /69   Pulse 55   Resp 18   Temp 97.6 °F (36.4 °C)   Temp src Oral   SpO2 97 %   O2 Device None (Room air)       Current Vitals:   Vital Signs  BP: 100/69  Pulse: 55  Resp: 18  Temp: 97.6 °F (36.4 °C)  Temp src: Oral    Oxygen Therapy  SpO2: 97 %  O2 Device: None (Room air)          Physical Exam  Vitals and nursing note reviewed.   Constitutional:       General: She is not in acute distress.     Appearance: Normal appearance. She is not ill-appearing, toxic-appearing or diaphoretic.   Cardiovascular:      Rate and Rhythm: Normal rate.      Heart sounds: Normal heart sounds.   Pulmonary:      Effort: Pulmonary effort is normal. No respiratory distress.      Breath sounds: Normal breath sounds.   Neurological:      Mental Status: She is alert and oriented to person, place, and time.   Psychiatric:         Mood and Affect: Mood is anxious.         Behavior: Behavior  normal.         ED Course  Labs Reviewed - No data to display  XR CHEST PA + LAT CHEST (QQO=99282)  Result Date: 6/22/2025  PROCEDURE:  XR CHEST PA + LAT CHEST (CPT=71046)  INDICATIONS:  Chest Discomfort-was here earlier  COMPARISON:  None.  TECHNIQUE:  PA and lateral chest radiographs were obtained.  PATIENT STATED HISTORY: (As transcribed by Technologist)  Chest discomfort left side X's 2 days.    FINDINGS:  LUNGS:  No focal consolidation.  Normal vascularity. CARDIAC:  Normal size cardiac silhouette. MEDIASTINUM:  Normal. PLEURA:  Normal.  No pleural effusions. BONES:  Normal for age.            CONCLUSION:  No consolidation.   LOCATION:  [facility]   Dictated by (CST): Chung Lam MD on 6/22/2025 at 9:42 AM     Finalized by (CST): Chung Lam MD on 6/22/2025 at 9:42 AM         I independently reviewed images. No acute cardiopulmonary process.     MDM     24-year-old female with past medical history significant for anorexia nervosa, generalized anxiety disorder, major depressive disorder returns to immediate care after just being discharged. She initially declined chest x-ray and labs for further evaluation of intermittent left-side chest discomfort for the past 2 days but now wants chest x-ray.     Differential diagnosis considered but not limited to anxiety, acute cardiac pathology, other     Physical exam as above. Chest x-ray negative for acute process.  Continue follow-up with PCP and psychiatrist as planned.  Return for worsening symptoms.      Medical Decision Making  Amount and/or Complexity of Data Reviewed  Radiology: ordered and independent interpretation performed. Decision-making details documented in ED Course.        Disposition and Plan     Clinical Impression:  1. Chest discomfort         Disposition:  Discharge  6/22/2025  9:48 am    Follow-up:  [facility]  [address]  [facility] [address]  [phone]    If symptoms worsen          Medications  Prescribed:  Current Discharge Medication List                Supplementary Documentation:

## 2025-06-22 NOTE — ED INITIAL ASSESSMENT (HPI)
Pt was seen a few minutes ago and  she declined the xray . Pt now states she wants the xray done

## 2025-06-22 NOTE — ED PROVIDER NOTES
Patient Seen in: [facility]        History  Chief Complaint   Patient presents with    Chest Pain     Stated Complaint: Chest discomfort/possible anxiety    Subjective:   HPI          24-year-old female with past medical history significant for anorexia nervosa, generalized anxiety disorder, major depressive disorder, presents to immediate care with 2-day history of left side chest discomfort. Patient states discomfort last for a few seconds then spontaneously resolves.  Denies aggravating or alleviating factors.  Denies SOB or any other associated symptoms.  She believes chest discomfort is secondary to anxiety.  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  She states she used to be on anxiolytics but discontinued these approximately 1 year ago and feels she is been okay until recently.  She sees psychiatry and will continue follow-up with them.  She denies SI or HI.        Objective:     Past Medical History:    Acute pharyngitis    Acute upper respiratory infections of unspecified site    Early onset of delivery, unspecified as to episode of care    Hemangioma of unspecified site    Pediculus capitis (head louse)    Personal history of pneumonia (recurrent)    Streptococcal sore throat    Unspecified otitis media              Past Surgical History:   Procedure Laterality Date    Appendectomy      Other surgical history      hemangioma on head    Other surgical history N/A 4/29/2016    Procedure: LAPAROSCOPIC APPENDECTOMY;  Surgeon: Kezia Katz MD;  Location:  MAIN OR    Other surgical history  2020    wisdom teeth    Tonsillectomy  2009/2010                Social History     Socioeconomic History    Marital status: Single   Tobacco Use    Smoking status: Never    Smokeless tobacco: Never   Vaping Use    Vaping status: Never Used   Substance and Sexual Activity    Alcohol use: Yes     Comment: 1 every 2 weeks    Drug use: Never    Sexual activity: Never      Partners: Male   Other Topics Concern    Caffeine Concern Yes     Comment: 1 c. coffee some days    Exercise Yes     Comment: walk, pilates              Review of Systems    Positive for stated complaint: Chest discomfort/possible anxiety  Other systems are as noted in HPI.  Constitutional and vital signs reviewed.      All other systems reviewed and negative except as noted above.          Physical Exam    ED Triage Vitals [06/22/25 0813]   /69   Pulse 55   Resp 18   Temp 97.6 °F (36.4 °C)   Temp src Oral   SpO2 97 %   O2 Device None (Room air)       Current Vitals:   Vital Signs  BP: 100/69  Pulse: 55  Resp: 18  Temp: 97.6 °F (36.4 °C)  Temp src: Oral    Oxygen Therapy  SpO2: 97 %  O2 Device: None (Room air)          Physical Exam  Vitals and nursing note reviewed.   Constitutional:       General: She is not in acute distress.     Appearance: Normal appearance. She is not ill-appearing, toxic-appearing or diaphoretic.   Cardiovascular:      Rate and Rhythm: Normal rate.      Heart sounds: Normal heart sounds.   Pulmonary:      Effort: Pulmonary effort is normal. No respiratory distress.      Breath sounds: Normal breath sounds. No wheezing.   Neurological:      Mental Status: She is alert and oriented to person, place, and time.   Psychiatric:         Mood and Affect: Mood is anxious.         Behavior: Behavior normal.         Thought Content: Thought content normal. Thought content does not include homicidal or suicidal ideation.         ED Course  Labs Reviewed - No data to display  EKG    Rate, intervals and axes as noted on EKG Report.  Rate: 51  Rhythm: Sinus Rhythm  Reading: Sinus bradycardia, otherwise normal ECG.           MDM     Differential diagnosis considered but not limited to anxiety, acute cardiac pathology, other    Patient is mildly anxious, physical exam is otherwise unremarkable. ECG with sinus bradycardia, otherwise normal. I offered patient chest x-ray, basic labs and troponin, to  which she considered but then politely declined and voiced concern her mom would be upset with cost.  Patient states she feels better knowing her ECG was normal and is comfortable with going home now.  She will continue follow-up with her PCP and psychiatrist.  She is in agreement to return for any new or worsening symptoms. Stable at discharge.         Medical Decision Making  Amount and/or Complexity of Data Reviewed  ECG/medicine tests: ordered and independent interpretation performed. Decision-making details documented in ED Course.        Disposition and Plan     Clinical Impression:  1. Chest discomfort         Disposition:  Discharge  6/22/2025  8:46 am    Follow-up:  [facility]  [address] [facility] [address]  [phone]    If symptoms worsen          Medications Prescribed:  Discharge Medication List as of 6/22/2025  8:47 AM                Supplementary Documentation:

## 2025-06-23 LAB
ATRIAL RATE: 51 BPM
P AXIS: 59 DEGREES
P-R INTERVAL: 182 MS
Q-T INTERVAL: 414 MS
QRS DURATION: 92 MS
QTC CALCULATION (BEZET): 381 MS
R AXIS: 72 DEGREES
T AXIS: 66 DEGREES
VENTRICULAR RATE: 51 BPM

## (undated) NOTE — LETTER
Name:  Radha Stovall Year:  11th Grade Class: Student ID No.:   Address:  [address] [place] [address] Phone:  [phone] (home) [phone] (work) :  13year old   Name Relationship [address] Work Phone Home Phone Mobile Phone polymorphic ventricular tachycardia? NO   15. Does anyone in your family have a heart problem, pacemaker, or implanted defibrillator? NO   16. Has anyone in your family had unexplained fainting, seizures, or near drowning?  NO   BONE AND JOINT QUESTIONS 37. Do you have headaches with exercise? NO   38. Have you ever had numbness, tingling, or weakness in your arms or legs after being hit or falling? NO   39. Have you ever been unable to move your arms / legs after being hit /fall? NO   40.  Have you ever be Appearance:  Marfan stigmata (kyphoscoliosis, high-arched palate, pectus excavatum,      arachnodactyly, arm span > height, hyperlaxity, myopia, MVP, aortic insufficiency) Yes    Eyes/Ears/Nose/Throat:    · Pupils equal  · Hearing Yes    Lymph nodes Yes that I/our student will not use performance-enhancing substances as defined in the [facility] Performance-Enhancing Substance Testing Program Protocol.  We have reviewed the policy and understand that I/our student may be asked to submit to testing for the presen

## (undated) NOTE — LETTER
Name:  Mariaa Scot Year:  10th Grade Class: Student ID No.:   Address:  [address] [place] [address] Phone:  [phone] (home) [phone] (work) :  14year old   Name Relationship [address] Work Phone Home Phone Mobile Phone polymorphic ventricular tachycardia? No   15. Does anyone in your family have a heart problem, pacemaker, or implanted defibrillator? No   16. Has anyone in your family had unexplained fainting, seizures, or near drowning?  No   BONE AND JOINT QUESTIONS 37. Do you have headaches with exercise? No   38. Have you ever had numbness, tingling, or weakness in your arms or legs after being hit or falling? No   39. Have you ever been unable to move your arms / legs after being hit /fall? No   40.  Have you ever be excavatum,      arachnodactyly, arm span > height, hyperlaxity, myopia, MVP, aortic insufficiency) Yes    Eyes/Ears/Nose/Throat:    · Pupils equal  · Hearing Yes    Lymph nodes Yes    Heart*  · Murmurs (auscultation standing, supine, +/- Valsalva)  · Locat defined in the [facility] Performance-Enhancing Substance Testing Program Protocol.  We have reviewed the policy and understand that I/our student may be asked to submit to testing for the presence of performance-enhancing substances in my/his/her body either dur